# Patient Record
Sex: MALE | Race: WHITE | NOT HISPANIC OR LATINO
[De-identification: names, ages, dates, MRNs, and addresses within clinical notes are randomized per-mention and may not be internally consistent; named-entity substitution may affect disease eponyms.]

---

## 2017-03-13 PROBLEM — Z00.00 ENCOUNTER FOR PREVENTIVE HEALTH EXAMINATION: Status: ACTIVE | Noted: 2017-03-13

## 2017-05-01 ENCOUNTER — RECORD ABSTRACTING (OUTPATIENT)
Age: 77
End: 2017-05-01

## 2017-05-01 ENCOUNTER — APPOINTMENT (OUTPATIENT)
Dept: INTERNAL MEDICINE | Facility: CLINIC | Age: 77
End: 2017-05-01

## 2017-05-01 VITALS
BODY MASS INDEX: 19.04 KG/M2 | HEART RATE: 70 BPM | SYSTOLIC BLOOD PRESSURE: 110 MMHG | OXYGEN SATURATION: 95 % | DIASTOLIC BLOOD PRESSURE: 58 MMHG | WEIGHT: 133 LBS | TEMPERATURE: 97.9 F | RESPIRATION RATE: 20 BRPM | HEIGHT: 70 IN

## 2017-05-01 DIAGNOSIS — I50.9 HEART FAILURE, UNSPECIFIED: ICD-10-CM

## 2017-05-01 DIAGNOSIS — E78.00 PURE HYPERCHOLESTEROLEMIA, UNSPECIFIED: ICD-10-CM

## 2017-05-01 DIAGNOSIS — C61 MALIGNANT NEOPLASM OF PROSTATE: ICD-10-CM

## 2017-05-01 RX ORDER — ROSUVASTATIN CALCIUM 10 MG/1
10 TABLET, FILM COATED ORAL
Qty: 30 | Refills: 0 | Status: ACTIVE | COMMUNITY
Start: 2017-05-01

## 2017-06-05 ENCOUNTER — NON-APPOINTMENT (OUTPATIENT)
Age: 77
End: 2017-06-05

## 2017-06-05 ENCOUNTER — APPOINTMENT (OUTPATIENT)
Dept: INTERNAL MEDICINE | Facility: CLINIC | Age: 77
End: 2017-06-05

## 2017-06-05 VITALS
OXYGEN SATURATION: 94 % | BODY MASS INDEX: 32.93 KG/M2 | SYSTOLIC BLOOD PRESSURE: 120 MMHG | RESPIRATION RATE: 20 BRPM | TEMPERATURE: 97.9 F | DIASTOLIC BLOOD PRESSURE: 74 MMHG | HEIGHT: 70 IN | WEIGHT: 229.99 LBS | HEART RATE: 79 BPM

## 2017-06-05 DIAGNOSIS — Z82.49 FAMILY HISTORY OF ISCHEMIC HEART DISEASE AND OTHER DISEASES OF THE CIRCULATORY SYSTEM: ICD-10-CM

## 2017-06-05 DIAGNOSIS — Z80.9 FAMILY HISTORY OF MALIGNANT NEOPLASM, UNSPECIFIED: ICD-10-CM

## 2017-06-05 RX ORDER — BUDESONIDE 0.5 MG/2ML
0.5 INHALANT ORAL TWICE DAILY
Qty: 2 | Refills: 0 | Status: ACTIVE | COMMUNITY
Start: 2017-06-05

## 2017-06-05 RX ORDER — PREDNISONE 20 MG/1
20 TABLET ORAL DAILY
Qty: 19 | Refills: 0 | Status: COMPLETED | COMMUNITY
Start: 2017-05-01 | End: 2017-06-05

## 2017-06-05 RX ORDER — ARFORMOTEROL TARTRATE 15 UG/2ML
15 SOLUTION RESPIRATORY (INHALATION) TWICE DAILY
Qty: 2 | Refills: 0 | Status: ACTIVE | COMMUNITY
Start: 2017-06-05

## 2017-06-12 ENCOUNTER — MEDICATION RENEWAL (OUTPATIENT)
Age: 77
End: 2017-06-12

## 2017-07-07 ENCOUNTER — RX RENEWAL (OUTPATIENT)
Age: 77
End: 2017-07-07

## 2017-08-21 ENCOUNTER — MEDICATION RENEWAL (OUTPATIENT)
Age: 77
End: 2017-08-21

## 2017-10-03 ENCOUNTER — APPOINTMENT (OUTPATIENT)
Dept: INTERNAL MEDICINE | Facility: CLINIC | Age: 77
End: 2017-10-03
Payer: MEDICARE

## 2017-10-03 VITALS
OXYGEN SATURATION: 95 % | WEIGHT: 220 LBS | BODY MASS INDEX: 31.5 KG/M2 | HEART RATE: 72 BPM | SYSTOLIC BLOOD PRESSURE: 130 MMHG | TEMPERATURE: 97.3 F | HEIGHT: 70 IN | DIASTOLIC BLOOD PRESSURE: 75 MMHG | RESPIRATION RATE: 18 BRPM

## 2017-10-03 DIAGNOSIS — Z23 ENCOUNTER FOR IMMUNIZATION: ICD-10-CM

## 2017-10-03 PROCEDURE — 90662 IIV NO PRSV INCREASED AG IM: CPT

## 2017-10-03 PROCEDURE — G0008: CPT

## 2017-10-03 PROCEDURE — 99214 OFFICE O/P EST MOD 30 MIN: CPT | Mod: 25

## 2017-10-03 PROCEDURE — 94060 EVALUATION OF WHEEZING: CPT

## 2017-12-16 ENCOUNTER — TRANSCRIPTION ENCOUNTER (OUTPATIENT)
Age: 77
End: 2017-12-16

## 2018-01-29 ENCOUNTER — TRANSCRIPTION ENCOUNTER (OUTPATIENT)
Age: 78
End: 2018-01-29

## 2018-02-15 ENCOUNTER — APPOINTMENT (OUTPATIENT)
Dept: INTERNAL MEDICINE | Facility: CLINIC | Age: 78
End: 2018-02-15
Payer: MEDICARE

## 2018-02-15 ENCOUNTER — NON-APPOINTMENT (OUTPATIENT)
Age: 78
End: 2018-02-15

## 2018-02-15 VITALS
SYSTOLIC BLOOD PRESSURE: 130 MMHG | OXYGEN SATURATION: 96 % | TEMPERATURE: 97.7 F | WEIGHT: 220 LBS | HEIGHT: 70 IN | RESPIRATION RATE: 18 BRPM | DIASTOLIC BLOOD PRESSURE: 78 MMHG | BODY MASS INDEX: 31.5 KG/M2 | HEART RATE: 68 BPM

## 2018-02-15 PROCEDURE — 99214 OFFICE O/P EST MOD 30 MIN: CPT | Mod: 25

## 2018-02-15 PROCEDURE — 94060 EVALUATION OF WHEEZING: CPT

## 2018-02-15 RX ORDER — FLUTICASONE PROPIONATE AND SALMETEROL 50; 500 UG/1; UG/1
500-50 POWDER RESPIRATORY (INHALATION)
Refills: 0 | Status: DISCONTINUED | COMMUNITY
End: 2018-02-15

## 2018-04-09 ENCOUNTER — APPOINTMENT (OUTPATIENT)
Dept: INTERNAL MEDICINE | Facility: CLINIC | Age: 78
End: 2018-04-09
Payer: MEDICARE

## 2018-04-09 VITALS
RESPIRATION RATE: 18 BRPM | DIASTOLIC BLOOD PRESSURE: 68 MMHG | OXYGEN SATURATION: 97 % | BODY MASS INDEX: 31.78 KG/M2 | SYSTOLIC BLOOD PRESSURE: 132 MMHG | WEIGHT: 222 LBS | HEART RATE: 86 BPM | HEIGHT: 70 IN | TEMPERATURE: 97.9 F

## 2018-04-09 DIAGNOSIS — F32.9 MAJOR DEPRESSIVE DISORDER, SINGLE EPISODE, UNSPECIFIED: ICD-10-CM

## 2018-04-09 DIAGNOSIS — I10 ESSENTIAL (PRIMARY) HYPERTENSION: ICD-10-CM

## 2018-04-09 PROCEDURE — 94729 DIFFUSING CAPACITY: CPT

## 2018-04-09 PROCEDURE — 99215 OFFICE O/P EST HI 40 MIN: CPT | Mod: 25

## 2018-04-09 PROCEDURE — 94727 GAS DIL/WSHOT DETER LNG VOL: CPT

## 2018-04-09 PROCEDURE — ZZZZZ: CPT

## 2018-04-09 PROCEDURE — 94060 EVALUATION OF WHEEZING: CPT

## 2018-04-09 RX ORDER — ASPIRIN 81 MG/1
81 TABLET ORAL
Refills: 0 | Status: ACTIVE | COMMUNITY

## 2018-04-09 RX ORDER — DILTIAZEM HYDROCHLORIDE 180 MG/1
180 TABLET, EXTENDED RELEASE ORAL
Refills: 0 | Status: DISCONTINUED | COMMUNITY
End: 2018-04-09

## 2018-04-09 RX ORDER — LOSARTAN POTASSIUM 25 MG/1
25 TABLET, FILM COATED ORAL
Refills: 0 | Status: ACTIVE | COMMUNITY

## 2018-04-09 RX ORDER — NORTRIPTYLINE HYDROCHLORIDE 50 MG/1
50 CAPSULE ORAL
Refills: 0 | Status: ACTIVE | COMMUNITY

## 2018-04-09 RX ORDER — FUROSEMIDE 20 MG/1
20 TABLET ORAL
Refills: 0 | Status: ACTIVE | COMMUNITY

## 2018-04-09 RX ORDER — CARVEDILOL 12.5 MG/1
12.5 TABLET, FILM COATED ORAL
Refills: 0 | Status: DISCONTINUED | COMMUNITY
End: 2018-04-09

## 2018-04-09 RX ORDER — ALBUTEROL SULFATE 2.5 MG/3ML
(2.5 MG/3ML) SOLUTION RESPIRATORY (INHALATION)
Qty: 1 | Refills: 3 | Status: DISCONTINUED | COMMUNITY
End: 2018-04-09

## 2018-04-27 ENCOUNTER — APPOINTMENT (OUTPATIENT)
Dept: RADIOLOGY | Facility: CLINIC | Age: 78
End: 2018-04-27
Payer: MEDICARE

## 2018-04-27 ENCOUNTER — OUTPATIENT (OUTPATIENT)
Dept: OUTPATIENT SERVICES | Facility: HOSPITAL | Age: 78
LOS: 1 days | End: 2018-04-27
Payer: MEDICARE

## 2018-04-27 ENCOUNTER — APPOINTMENT (OUTPATIENT)
Dept: INTERNAL MEDICINE | Facility: CLINIC | Age: 78
End: 2018-04-27
Payer: MEDICARE

## 2018-04-27 ENCOUNTER — NON-APPOINTMENT (OUTPATIENT)
Age: 78
End: 2018-04-27

## 2018-04-27 VITALS
HEIGHT: 70 IN | DIASTOLIC BLOOD PRESSURE: 80 MMHG | WEIGHT: 217 LBS | BODY MASS INDEX: 31.07 KG/M2 | HEART RATE: 80 BPM | RESPIRATION RATE: 16 BRPM | SYSTOLIC BLOOD PRESSURE: 124 MMHG | OXYGEN SATURATION: 97 % | TEMPERATURE: 98.8 F

## 2018-04-27 DIAGNOSIS — Z00.8 ENCOUNTER FOR OTHER GENERAL EXAMINATION: ICD-10-CM

## 2018-04-27 PROCEDURE — 71046 X-RAY EXAM CHEST 2 VIEWS: CPT

## 2018-04-27 PROCEDURE — 99214 OFFICE O/P EST MOD 30 MIN: CPT | Mod: 25

## 2018-04-27 PROCEDURE — 71046 X-RAY EXAM CHEST 2 VIEWS: CPT | Mod: 26

## 2018-04-27 PROCEDURE — 94060 EVALUATION OF WHEEZING: CPT

## 2018-04-27 RX ORDER — ALBUTEROL SULFATE 90 UG/1
108 (90 BASE) AEROSOL, METERED RESPIRATORY (INHALATION)
Refills: 0 | Status: DISCONTINUED | COMMUNITY
End: 2018-04-27

## 2018-05-11 ENCOUNTER — APPOINTMENT (OUTPATIENT)
Dept: INTERNAL MEDICINE | Facility: CLINIC | Age: 78
End: 2018-05-11

## 2018-10-08 ENCOUNTER — APPOINTMENT (OUTPATIENT)
Dept: INTERNAL MEDICINE | Facility: CLINIC | Age: 78
End: 2018-10-08
Payer: MEDICARE

## 2018-10-08 ENCOUNTER — NON-APPOINTMENT (OUTPATIENT)
Age: 78
End: 2018-10-08

## 2018-10-08 VITALS
WEIGHT: 209.99 LBS | TEMPERATURE: 97.6 F | SYSTOLIC BLOOD PRESSURE: 140 MMHG | OXYGEN SATURATION: 96 % | DIASTOLIC BLOOD PRESSURE: 68 MMHG | BODY MASS INDEX: 30.06 KG/M2 | RESPIRATION RATE: 16 BRPM | HEART RATE: 64 BPM | HEIGHT: 70 IN

## 2018-10-08 PROCEDURE — G0008: CPT

## 2018-10-08 PROCEDURE — 90662 IIV NO PRSV INCREASED AG IM: CPT

## 2018-10-08 PROCEDURE — 99214 OFFICE O/P EST MOD 30 MIN: CPT | Mod: 25

## 2018-10-08 PROCEDURE — 94060 EVALUATION OF WHEEZING: CPT

## 2018-10-08 RX ORDER — PREDNISONE 20 MG/1
20 TABLET ORAL
Qty: 19 | Refills: 0 | Status: DISCONTINUED | COMMUNITY
Start: 2018-04-27 | End: 2018-10-08

## 2018-10-08 RX ORDER — CEFUROXIME AXETIL 500 MG/1
500 TABLET ORAL
Qty: 14 | Refills: 0 | Status: DISCONTINUED | COMMUNITY
Start: 2018-04-27 | End: 2018-10-08

## 2018-10-08 NOTE — HISTORY OF PRESENT ILLNESS
[de-identified] : The patient comes in today for a followup evaluation, and reassessment of his pulmonary status.\par \par Overall, from a pulmonary standpoint, the patient states that he is doing relatively well. He continues to be compliant with his aerosol regimen. He notes, that upon awakening in the morning, that he develops a slight degree of chest tightness after using his nebulizer. He then usually produces light yellow sputum, and within an hour, the symptoms of chest tightness improved and resolved. He is then usually good throughout the remainder of the day. He denies any pleuritic chest pain. There has been no hemoptysis. He continues to followup with his primary care physician.\par \par With regard to his underlying coronary artery disease, he continues to be followed by his cardiologist. He remained stable. He has not had any episodes of angina. He still does complain of shortness of breath when walking up inclines or up stairs. There were no other acute pulmonary constitutional symptoms at this time. He has not yet received his flu shot. He now comes in for this assessment.

## 2018-10-08 NOTE — DATA REVIEWED
[FreeTextEntry1] : Spirometric analysis reveals a significant degree of obstruction. Bronchodilator reactivity is not demonstrated.\par \par CAT scan of the chest performed on 9/21/18 is reviewed with the patient. The previously noted 9 mm right upper lung field nodule is nearly completely resolved. There is a new 6 x 6 mm nodule, however, inferior to the above-noted nodule. There are still other tiny clusters of tree in bud nodularity scattered in the lower lung sounds as well. Overall, these areas appear to wax and wane.

## 2018-10-08 NOTE — PHYSICAL EXAM
[General Appearance - Alert] : alert [General Appearance - In No Acute Distress] : in no acute distress [General Appearance - Well Nourished] : well nourished [Neck Appearance] : the appearance of the neck was normal [] : the neck was supple [Heart Sounds Pericardial Friction Rub] : no pericardial rub [Arterial Pulses Carotid] : carotid pulses were normal with no bruits [Bowel Sounds] : normal bowel sounds [Abdomen Soft] : soft [Abdomen Tenderness] : non-tender [FreeTextEntry1] : Moderate to significant centripetal obesity [Cervical Lymph Nodes Enlarged Posterior Bilaterally] : posterior cervical [Nail Clubbing] : no clubbing  or cyanosis of the fingernails

## 2018-10-08 NOTE — PLAN
[FreeTextEntry1] : 1. Continue his medication as outlined above including the bronchodilators.\par \par 2. With regards to the waxing and waning pulmonary nodules, they most likely represent areas of mucoid impaction. He is read as having areas of peribronchial inflammatory changes. There are also tree in bud abnormalities noted, which most likely represent inflammatory change. To this end, since there does not appear to be a suspicious single dominant lesion at this time, and given the fact that the previously noted 9 mm lesion has resolved, I will now recommend a followup study in one year, instead of 6 months.\par \par 3. Flu shot\par \par 4. Followup with myself in 6 months with full pulmonary function testing.\par \par 5. Routine medical followup with his primary care physician, Dr. Myers.

## 2018-10-29 ENCOUNTER — RX RENEWAL (OUTPATIENT)
Age: 78
End: 2018-10-29

## 2019-03-25 ENCOUNTER — FORM ENCOUNTER (OUTPATIENT)
Age: 79
End: 2019-03-25

## 2019-03-26 ENCOUNTER — APPOINTMENT (OUTPATIENT)
Age: 79
End: 2019-03-26
Payer: MEDICARE

## 2019-03-26 ENCOUNTER — OUTPATIENT (OUTPATIENT)
Dept: OUTPATIENT SERVICES | Facility: HOSPITAL | Age: 79
LOS: 1 days | End: 2019-03-26
Payer: MEDICARE

## 2019-03-26 ENCOUNTER — APPOINTMENT (OUTPATIENT)
Dept: OTOLARYNGOLOGY | Facility: CLINIC | Age: 79
End: 2019-03-26
Payer: MEDICARE

## 2019-03-26 VITALS — BODY MASS INDEX: 30.06 KG/M2 | HEIGHT: 70 IN | WEIGHT: 210 LBS

## 2019-03-26 VITALS — HEART RATE: 71 BPM | DIASTOLIC BLOOD PRESSURE: 72 MMHG | SYSTOLIC BLOOD PRESSURE: 155 MMHG

## 2019-03-26 DIAGNOSIS — I51.9 HEART DISEASE, UNSPECIFIED: ICD-10-CM

## 2019-03-26 DIAGNOSIS — C44.300 UNSPECIFIED MALIGNANT NEOPLASM OF SKIN OF UNSPECIFIED PART OF FACE: ICD-10-CM

## 2019-03-26 PROCEDURE — 99205 OFFICE O/P NEW HI 60 MIN: CPT

## 2019-03-26 PROCEDURE — 82565 ASSAY OF CREATININE: CPT

## 2019-03-26 PROCEDURE — 70491 CT SOFT TISSUE NECK W/DYE: CPT

## 2019-03-26 PROCEDURE — 70491 CT SOFT TISSUE NECK W/DYE: CPT | Mod: 26

## 2019-03-26 RX ORDER — CARVEDILOL 25 MG/1
TABLET, FILM COATED ORAL
Refills: 0 | Status: ACTIVE | COMMUNITY

## 2019-03-26 NOTE — CONSULT LETTER
[Consult Letter:] : I had the pleasure of evaluating your patient, [unfilled]. [Please see my note below.] : Please see my note below. [Consult Closing:] : Thank you very much for allowing me to participate in the care of this patient.  If you have any questions, please do not hesitate to contact me. [Sincerely,] : Sincerely, [Dear  ___] : Dear  [unfilled], [DrSadia  ___] : Dr. KRISHNA [FreeTextEntry2] : Phong Rollins MD\par [address] [place] [address]\par [place] [id number] [FreeTextEntry3] : Pedrito Curry MD, FACS\par Clinical \par Dept. of Otolaryngology\par [facility]\par  [DrSadia ___] : Dr. KRISHNA

## 2019-03-26 NOTE — HISTORY OF PRESENT ILLNESS
[de-identified] : 78 year old male referred by Dr. 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  States has had the skin cancer for at least the past 4 to 6 months.  States has been growing in the past few days.  States pain level 2/10.  States drainage, clear fluid, for the past 1.5 - 2 months.  Denies recent testing.

## 2019-03-26 NOTE — REASON FOR VISIT
[Initial Consultation] : an initial consultation for [Spouse] : spouse [Family Member] : family member [FreeTextEntry2] : referred by Dr. 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

## 2019-03-26 NOTE — PHYSICAL EXAM
[Midline] : trachea located in midline position [Normal] : no rashes [de-identified] : R pinna deformity from prior surgery [de-identified] : Nasal bridge deformity from prior surgery [de-identified] : R face lesion palpabe deep to R buccal mucosa [FreeTextEntry2] : 4.5 cm ulcerated mass of right check, with deep ulceration

## 2019-03-29 ENCOUNTER — APPOINTMENT (OUTPATIENT)
Dept: INTERNAL MEDICINE | Facility: CLINIC | Age: 79
End: 2019-03-29
Payer: COMMERCIAL

## 2019-03-29 ENCOUNTER — APPOINTMENT (OUTPATIENT)
Dept: INTERNAL MEDICINE | Facility: CLINIC | Age: 79
End: 2019-03-29
Payer: MEDICARE

## 2019-03-29 VITALS
TEMPERATURE: 98.6 F | WEIGHT: 207 LBS | BODY MASS INDEX: 29.63 KG/M2 | RESPIRATION RATE: 16 BRPM | HEART RATE: 84 BPM | HEIGHT: 70 IN | SYSTOLIC BLOOD PRESSURE: 128 MMHG | DIASTOLIC BLOOD PRESSURE: 80 MMHG | OXYGEN SATURATION: 97 %

## 2019-03-29 VITALS
OXYGEN SATURATION: 97 % | RESPIRATION RATE: 14 BRPM | SYSTOLIC BLOOD PRESSURE: 132 MMHG | HEART RATE: 86 BPM | DIASTOLIC BLOOD PRESSURE: 78 MMHG

## 2019-03-29 DIAGNOSIS — C44.300 UNSPECIFIED MALIGNANT NEOPLASM OF SKIN OF UNSPECIFIED PART OF FACE: ICD-10-CM

## 2019-03-29 PROCEDURE — 94727 GAS DIL/WSHOT DETER LNG VOL: CPT

## 2019-03-29 PROCEDURE — 99215 OFFICE O/P EST HI 40 MIN: CPT | Mod: 25

## 2019-03-29 PROCEDURE — 94729 DIFFUSING CAPACITY: CPT

## 2019-03-29 PROCEDURE — 94060 EVALUATION OF WHEEZING: CPT

## 2019-03-29 PROCEDURE — ZZZZZ: CPT

## 2019-03-29 NOTE — DATA REVIEWED
[FreeTextEntry1] : Pulmonary function test is performed. Lung volumes reveal a mild decrease in the total lung capacity and FRC. Residual volume is normal. The vital capacity is moderately reduced. Lung mechanics reveal a moderate to significant decrease in flow rates without bronchodilator reactivity. The DLCO is moderately reduced at 54%. Saturation is 97%. This represents a significant degree of obstructive disease. The constellation of the reduction in flow rates, the absence of bronchodilator reactivity, and a decrease in the DLCO, is consistent with anatomic emphysema. An underlying restrictive process cannot be entirely ruled out. Saturation is maintained.

## 2019-03-29 NOTE — PLAN
[FreeTextEntry1] : 1. Continue with medication as outlined above.\par \par 2. With regards to the upcoming surgical resection of the squamous cell carcinoma on the right side of his face, he will be pretreated with prednisone which she will take 40 mg 2 days prior to the procedure, 40 mg one day prior to the procedure, and 20 mg on the day of the procedure to treat his underlying advanced obstructive lung disease.\par \par 3. The patient will continue to follow up with myself as previously recommended with a CAT scan of the chest which needs to be performed in September of this year. A note is the fact that the patient had waxing and waning pulmonary nodules which are felt to represent mucoid impaction. Treated in odd abnormalities were also noted which are felt to represent inflammatory change. There were no solitary suspicious dominant lesions, and the previously noted 9 mm lesion has resolved completely.\par \par 4. Followup in approximately one month with pre-post spirometry and O2 saturation. The patient will be given a prescription at that time for a followup CAT scan to be performed in September\par \par \par \par Addendum: There are no absolute pulmonary contraindications to the preplanned procedure.

## 2019-03-29 NOTE — PHYSICAL EXAM
[General Appearance - Alert] : alert [General Appearance - In No Acute Distress] : in no acute distress [General Appearance - Well Nourished] : well nourished [Sclera] : the sclera and conjunctiva were normal [PERRL With Normal Accommodation] : pupils were equal in size, round, and reactive to light [Extraocular Movements] : extraocular movements were intact [Neck Appearance] : the appearance of the neck was normal [] : the neck was supple [Heart Sounds Pericardial Friction Rub] : no pericardial rub [Arterial Pulses Carotid] : carotid pulses were normal with no bruits [Bowel Sounds] : normal bowel sounds [Abdomen Soft] : soft [Abdomen Tenderness] : non-tender [Cervical Lymph Nodes Enlarged Posterior Bilaterally] : posterior cervical [Nail Clubbing] : no clubbing  or cyanosis of the fingernails [Skin Color & Pigmentation] : normal skin color and pigmentation [FreeTextEntry1] : Evidence for multiple areas in which skin cancer was removed.There is a large cavitary lesion in the middle of the right cheek measuring 2 x 1.5 cm. The margins are clean without purulent drainage noted

## 2019-03-29 NOTE — REVIEW OF SYSTEMS
[Negative] : Heme/Lymph [FreeTextEntry4] : see history of present illness [FreeTextEntry6] : see history of present illness [de-identified] : see history of present illness

## 2019-03-29 NOTE — HISTORY OF PRESENT ILLNESS
[de-identified] : The patient comes in today for a preoperative pulmonary clearance.\par \par The patient states that he noted the onset of the lesion on the right side of his face in January. He began picking at the lesion, and it began to get larger. He went to [place]. The lesion then began oozing clear fluid. He returned home, and was seen by a dermatologist on 3/14/19. A biopsy was performed and it revealed advanced squamous cell carcinoma. He then underwent a CAT scan of the neck. He is noted to have a 1.9 x 1.7 x 3.6 cm lesion in the right cheek region. A second lesion measuring 2.4 x 2.6 cm was noted to be present near the sternocleidomastoid and clavicle region. He now comes in for this assessment.\par \par The patient states that from a palmar standpoint he has been doing well. He is very compliant with his nebulizer using budesonide and formoterol on a b.i.d. basis. He also uses the spur Daniella on a daily basis as well. He did have one episode of bronchitis back in December that was treated with doxycycline and a Medrol Dosepak. This was while he was in [place]. He was noted to have good results. He is now basically back to his baseline.\par \par At this time, the patient denies any purulent sputum production. He does occasionally produce clear secretions. He denies any overt wheezing. He has shortness of breath with moderate amounts of exertion, but has not been any progression. He now comes in for this assessment.

## 2019-03-30 ENCOUNTER — TRANSCRIPTION ENCOUNTER (OUTPATIENT)
Age: 79
End: 2019-03-30

## 2019-04-03 ENCOUNTER — RESULT REVIEW (OUTPATIENT)
Age: 79
End: 2019-04-03

## 2019-04-03 DIAGNOSIS — R22.1 LOCALIZED SWELLING, MASS AND LUMP, NECK: ICD-10-CM

## 2019-04-07 ENCOUNTER — FORM ENCOUNTER (OUTPATIENT)
Age: 79
End: 2019-04-07

## 2019-04-08 ENCOUNTER — APPOINTMENT (OUTPATIENT)
Dept: ULTRASOUND IMAGING | Facility: CLINIC | Age: 79
End: 2019-04-08
Payer: MEDICARE

## 2019-04-08 ENCOUNTER — OUTPATIENT (OUTPATIENT)
Dept: OUTPATIENT SERVICES | Facility: HOSPITAL | Age: 79
LOS: 1 days | End: 2019-04-08
Payer: MEDICARE

## 2019-04-08 ENCOUNTER — RESULT REVIEW (OUTPATIENT)
Age: 79
End: 2019-04-08

## 2019-04-08 DIAGNOSIS — R22.1 LOCALIZED SWELLING, MASS AND LUMP, NECK: ICD-10-CM

## 2019-04-08 DIAGNOSIS — C44.300 UNSPECIFIED MALIGNANT NEOPLASM OF SKIN OF UNSPECIFIED PART OF FACE: ICD-10-CM

## 2019-04-08 PROCEDURE — 10005 FNA BX W/US GDN 1ST LES: CPT

## 2019-04-15 ENCOUNTER — MESSAGE (OUTPATIENT)
Age: 79
End: 2019-04-15

## 2019-04-15 ENCOUNTER — RESULT REVIEW (OUTPATIENT)
Age: 79
End: 2019-04-15

## 2019-04-22 ENCOUNTER — APPOINTMENT (OUTPATIENT)
Dept: INTERNAL MEDICINE | Facility: CLINIC | Age: 79
End: 2019-04-22

## 2019-05-09 ENCOUNTER — FORM ENCOUNTER (OUTPATIENT)
Age: 79
End: 2019-05-09

## 2019-05-10 ENCOUNTER — OUTPATIENT (OUTPATIENT)
Dept: OUTPATIENT SERVICES | Facility: HOSPITAL | Age: 79
LOS: 1 days | End: 2019-05-10
Payer: MEDICARE

## 2019-05-10 ENCOUNTER — APPOINTMENT (OUTPATIENT)
Dept: CT IMAGING | Facility: CLINIC | Age: 79
End: 2019-05-10
Payer: MEDICARE

## 2019-05-10 DIAGNOSIS — R91.8 OTHER NONSPECIFIC ABNORMAL FINDING OF LUNG FIELD: ICD-10-CM

## 2019-05-10 PROCEDURE — 71250 CT THORAX DX C-: CPT | Mod: 26

## 2019-05-10 PROCEDURE — 71250 CT THORAX DX C-: CPT

## 2019-05-20 ENCOUNTER — APPOINTMENT (OUTPATIENT)
Dept: INTERNAL MEDICINE | Facility: CLINIC | Age: 79
End: 2019-05-20
Payer: MEDICARE

## 2019-05-20 ENCOUNTER — NON-APPOINTMENT (OUTPATIENT)
Age: 79
End: 2019-05-20

## 2019-05-20 VITALS
HEART RATE: 76 BPM | HEIGHT: 70 IN | SYSTOLIC BLOOD PRESSURE: 124 MMHG | DIASTOLIC BLOOD PRESSURE: 66 MMHG | RESPIRATION RATE: 16 BRPM | TEMPERATURE: 98.1 F | WEIGHT: 208 LBS | BODY MASS INDEX: 29.78 KG/M2 | OXYGEN SATURATION: 97 %

## 2019-05-20 DIAGNOSIS — R06.2 WHEEZING: ICD-10-CM

## 2019-05-20 DIAGNOSIS — Z88.9 ALLERGY STATUS TO UNSPECIFIED DRUGS, MEDICAMENTS AND BIOLOGICAL SUBSTANCES: ICD-10-CM

## 2019-05-20 DIAGNOSIS — J44.9 CHRONIC OBSTRUCTIVE PULMONARY DISEASE, UNSPECIFIED: ICD-10-CM

## 2019-05-20 DIAGNOSIS — J30.2 OTHER SEASONAL ALLERGIC RHINITIS: ICD-10-CM

## 2019-05-20 PROCEDURE — 94060 EVALUATION OF WHEEZING: CPT

## 2019-05-20 PROCEDURE — 99214 OFFICE O/P EST MOD 30 MIN: CPT | Mod: 25

## 2019-05-20 RX ORDER — PREDNISONE 20 MG/1
20 TABLET ORAL
Qty: 5 | Refills: 0 | Status: DISCONTINUED | COMMUNITY
Start: 2019-03-29 | End: 2019-05-20

## 2019-06-07 ENCOUNTER — RX RENEWAL (OUTPATIENT)
Age: 79
End: 2019-06-07

## 2019-06-09 PROBLEM — I51.9 HEART DISEASE: Status: ACTIVE | Noted: 2019-03-26

## 2019-07-11 ENCOUNTER — FORM ENCOUNTER (OUTPATIENT)
Age: 79
End: 2019-07-11

## 2019-07-12 ENCOUNTER — APPOINTMENT (OUTPATIENT)
Dept: NUCLEAR MEDICINE | Facility: CLINIC | Age: 79
End: 2019-07-12
Payer: MEDICARE

## 2019-07-12 ENCOUNTER — OUTPATIENT (OUTPATIENT)
Dept: OUTPATIENT SERVICES | Facility: HOSPITAL | Age: 79
LOS: 1 days | End: 2019-07-12
Payer: MEDICARE

## 2019-07-12 DIAGNOSIS — R91.8 OTHER NONSPECIFIC ABNORMAL FINDING OF LUNG FIELD: ICD-10-CM

## 2019-07-12 PROCEDURE — 78815 PET IMAGE W/CT SKULL-THIGH: CPT | Mod: 26,PS

## 2019-07-12 PROCEDURE — A9552: CPT

## 2019-07-12 PROCEDURE — 78815 PET IMAGE W/CT SKULL-THIGH: CPT

## 2019-07-18 RX ORDER — TIOTROPIUM BROMIDE INHALATION SPRAY 1.56 UG/1
1.25 SPRAY, METERED RESPIRATORY (INHALATION) DAILY
Qty: 1 | Refills: 11 | Status: DISCONTINUED | COMMUNITY
End: 2019-07-18

## 2019-07-22 ENCOUNTER — NON-APPOINTMENT (OUTPATIENT)
Age: 79
End: 2019-07-22

## 2019-07-22 ENCOUNTER — APPOINTMENT (OUTPATIENT)
Dept: INTERNAL MEDICINE | Facility: CLINIC | Age: 79
End: 2019-07-22
Payer: MEDICARE

## 2019-07-22 VITALS
OXYGEN SATURATION: 95 % | WEIGHT: 204 LBS | HEIGHT: 70 IN | HEART RATE: 80 BPM | TEMPERATURE: 98.1 F | DIASTOLIC BLOOD PRESSURE: 62 MMHG | RESPIRATION RATE: 16 BRPM | BODY MASS INDEX: 29.2 KG/M2 | SYSTOLIC BLOOD PRESSURE: 132 MMHG

## 2019-07-22 DIAGNOSIS — Z87.891 PERSONAL HISTORY OF NICOTINE DEPENDENCE: ICD-10-CM

## 2019-07-22 DIAGNOSIS — R91.8 OTHER NONSPECIFIC ABNORMAL FINDING OF LUNG FIELD: ICD-10-CM

## 2019-07-22 DIAGNOSIS — J44.9 CHRONIC OBSTRUCTIVE PULMONARY DISEASE, UNSPECIFIED: ICD-10-CM

## 2019-07-22 DIAGNOSIS — R05 COUGH: ICD-10-CM

## 2019-07-22 DIAGNOSIS — R06.00 DYSPNEA, UNSPECIFIED: ICD-10-CM

## 2019-07-22 DIAGNOSIS — J43.9 EMPHYSEMA, UNSPECIFIED: ICD-10-CM

## 2019-07-22 DIAGNOSIS — I25.10 ATHEROSCLEROTIC HEART DISEASE OF NATIVE CORONARY ARTERY W/OUT ANGINA PECTORIS: ICD-10-CM

## 2019-07-22 PROCEDURE — 94060 EVALUATION OF WHEEZING: CPT

## 2019-07-22 PROCEDURE — 99215 OFFICE O/P EST HI 40 MIN: CPT | Mod: 25

## 2019-07-28 ENCOUNTER — FORM ENCOUNTER (OUTPATIENT)
Age: 79
End: 2019-07-28

## 2019-07-29 ENCOUNTER — RESULT REVIEW (OUTPATIENT)
Age: 79
End: 2019-07-29

## 2019-07-29 ENCOUNTER — OUTPATIENT (OUTPATIENT)
Dept: OUTPATIENT SERVICES | Facility: HOSPITAL | Age: 79
LOS: 1 days | Discharge: HOME | End: 2019-07-29
Payer: MEDICARE

## 2019-07-29 VITALS
TEMPERATURE: 98 F | WEIGHT: 206.35 LBS | DIASTOLIC BLOOD PRESSURE: 71 MMHG | SYSTOLIC BLOOD PRESSURE: 135 MMHG | RESPIRATION RATE: 16 BRPM | HEIGHT: 70 IN | OXYGEN SATURATION: 99 % | HEART RATE: 66 BPM

## 2019-07-29 VITALS
OXYGEN SATURATION: 99 % | TEMPERATURE: 98 F | RESPIRATION RATE: 16 BRPM | SYSTOLIC BLOOD PRESSURE: 126 MMHG | DIASTOLIC BLOOD PRESSURE: 55 MMHG | HEART RATE: 81 BPM

## 2019-07-29 DIAGNOSIS — Z95.5 PRESENCE OF CORONARY ANGIOPLASTY IMPLANT AND GRAFT: Chronic | ICD-10-CM

## 2019-07-29 DIAGNOSIS — R91.8 OTHER NONSPECIFIC ABNORMAL FINDING OF LUNG FIELD: ICD-10-CM

## 2019-07-29 LAB
ANION GAP SERPL CALC-SCNC: 5 MMOL/L — SIGNIFICANT CHANGE UP (ref 5–17)
BUN SERPL-MCNC: 13 MG/DL — SIGNIFICANT CHANGE UP (ref 7–23)
CALCIUM SERPL-MCNC: 9.1 MG/DL — SIGNIFICANT CHANGE UP (ref 8.5–10.1)
CHLORIDE SERPL-SCNC: 105 MMOL/L — SIGNIFICANT CHANGE UP (ref 96–108)
CO2 SERPL-SCNC: 29 MMOL/L — SIGNIFICANT CHANGE UP (ref 22–31)
CREAT SERPL-MCNC: 0.87 MG/DL — SIGNIFICANT CHANGE UP (ref 0.5–1.3)
GLUCOSE SERPL-MCNC: 111 MG/DL — HIGH (ref 70–99)
HCT VFR BLD CALC: 39.6 % — SIGNIFICANT CHANGE UP (ref 39–50)
HGB BLD-MCNC: 12.8 G/DL — LOW (ref 13–17)
INR BLD: 1.03 RATIO — SIGNIFICANT CHANGE UP (ref 0.88–1.16)
INR PPP: 1.03
MCHC RBC-ENTMCNC: 28.8 PG — SIGNIFICANT CHANGE UP (ref 27–34)
MCHC RBC-ENTMCNC: 32.3 GM/DL — SIGNIFICANT CHANGE UP (ref 32–36)
MCV RBC AUTO: 89.2 FL — SIGNIFICANT CHANGE UP (ref 80–100)
PLATELET # BLD AUTO: 201 K/UL — SIGNIFICANT CHANGE UP (ref 150–400)
POTASSIUM SERPL-MCNC: 4.1 MMOL/L — SIGNIFICANT CHANGE UP (ref 3.5–5.3)
POTASSIUM SERPL-SCNC: 4.1 MMOL/L — SIGNIFICANT CHANGE UP (ref 3.5–5.3)
PROTHROM AB SERPL-ACNC: 11.4 SEC — SIGNIFICANT CHANGE UP (ref 10–12.9)
PT BLD: 11.4
RBC # BLD: 4.44 M/UL — SIGNIFICANT CHANGE UP (ref 4.2–5.8)
RBC # FLD: 13.4 % — SIGNIFICANT CHANGE UP (ref 10.3–14.5)
SODIUM SERPL-SCNC: 139 MMOL/L — SIGNIFICANT CHANGE UP (ref 135–145)
WBC # BLD: 3.08 K/UL — LOW (ref 3.8–10.5)
WBC # FLD AUTO: 3.08 K/UL — LOW (ref 3.8–10.5)

## 2019-07-29 PROCEDURE — 32405: CPT

## 2019-07-29 PROCEDURE — 71045 X-RAY EXAM CHEST 1 VIEW: CPT | Mod: 26

## 2019-07-29 PROCEDURE — 88341 IMHCHEM/IMCYTCHM EA ADD ANTB: CPT | Mod: 26

## 2019-07-29 PROCEDURE — 71045 X-RAY EXAM CHEST 1 VIEW: CPT | Mod: 26,77

## 2019-07-29 PROCEDURE — 88173 CYTOPATH EVAL FNA REPORT: CPT | Mod: 26

## 2019-07-29 PROCEDURE — 77012 CT SCAN FOR NEEDLE BIOPSY: CPT | Mod: 26

## 2019-07-29 PROCEDURE — 88342 IMHCHEM/IMCYTCHM 1ST ANTB: CPT | Mod: 26

## 2019-07-29 PROCEDURE — 93010 ELECTROCARDIOGRAM REPORT: CPT

## 2019-07-29 PROCEDURE — 88305 TISSUE EXAM BY PATHOLOGIST: CPT | Mod: 26

## 2019-07-29 RX ORDER — CARVEDILOL PHOSPHATE 80 MG/1
1 CAPSULE, EXTENDED RELEASE ORAL
Qty: 0 | Refills: 0 | DISCHARGE

## 2019-07-29 RX ORDER — FUROSEMIDE 40 MG
1 TABLET ORAL
Qty: 0 | Refills: 0 | DISCHARGE

## 2019-07-29 RX ORDER — FENTANYL CITRATE 50 UG/ML
50 INJECTION INTRAVENOUS
Refills: 0 | Status: DISCONTINUED | OUTPATIENT
Start: 2019-07-29 | End: 2019-07-29

## 2019-07-29 RX ORDER — TIOTROPIUM BROMIDE 18 UG/1
2 CAPSULE ORAL; RESPIRATORY (INHALATION)
Qty: 0 | Refills: 0 | DISCHARGE

## 2019-07-29 RX ORDER — ONDANSETRON 8 MG/1
4 TABLET, FILM COATED ORAL ONCE
Refills: 0 | Status: DISCONTINUED | OUTPATIENT
Start: 2019-07-29 | End: 2019-07-29

## 2019-07-29 RX ORDER — OXYCODONE HYDROCHLORIDE 5 MG/1
5 TABLET ORAL ONCE
Refills: 0 | Status: DISCONTINUED | OUTPATIENT
Start: 2019-07-29 | End: 2019-07-29

## 2019-07-29 RX ORDER — NORTRIPTYLINE HYDROCHLORIDE 10 MG/1
1 CAPSULE ORAL
Qty: 0 | Refills: 0 | DISCHARGE

## 2019-07-29 RX ORDER — BUDESONIDE, MICRONIZED 100 %
2 POWDER (GRAM) MISCELLANEOUS
Qty: 0 | Refills: 0 | DISCHARGE

## 2019-07-29 RX ORDER — ARFORMOTEROL TARTRATE 15 UG/2ML
2 SOLUTION RESPIRATORY (INHALATION)
Qty: 0 | Refills: 0 | DISCHARGE

## 2019-07-29 RX ORDER — METFORMIN HYDROCHLORIDE 850 MG/1
1 TABLET ORAL
Qty: 0 | Refills: 0 | DISCHARGE

## 2019-07-29 RX ORDER — ROSUVASTATIN CALCIUM 5 MG/1
1 TABLET ORAL
Qty: 0 | Refills: 0 | DISCHARGE

## 2019-07-29 RX ORDER — SODIUM CHLORIDE 9 MG/ML
1000 INJECTION, SOLUTION INTRAVENOUS
Refills: 0 | Status: DISCONTINUED | OUTPATIENT
Start: 2019-07-29 | End: 2019-07-29

## 2019-07-29 RX ORDER — LOSARTAN POTASSIUM 100 MG/1
1 TABLET, FILM COATED ORAL
Qty: 0 | Refills: 0 | DISCHARGE

## 2019-07-29 RX ORDER — OXYCODONE HYDROCHLORIDE 5 MG/1
10 TABLET ORAL ONCE
Refills: 0 | Status: DISCONTINUED | OUTPATIENT
Start: 2019-07-29 | End: 2019-07-29

## 2019-07-29 RX ORDER — ASPIRIN/CALCIUM CARB/MAGNESIUM 324 MG
1 TABLET ORAL
Qty: 0 | Refills: 0 | DISCHARGE

## 2019-07-29 NOTE — ASU PATIENT PROFILE, ADULT - PRO MENTAL HEALTH SX RECENT
Site: [facility], [place]  Date 4 9 18  Surgeon: Kendal  Anesthesia: iv sedation and local infiltration xylocaine  Preop diagnosis: elevated psa  Postop diagnosis: same  Procedure: TRANSRECTAL ULTRASOUND OF PROSTATE WITH NEEDLE BIOPSIES. ULTRASOUND GUIDANCE FOR NEEDLE BIOPSIES  Complications: none  EBL: None    Description of the procedure:  Patient took a prophylactic antibiotic starting last night. He also gave himself a Fleet enema 2 hours prior to the procedure. Pt was taken to the operating room. He was placed in the L lateral decubitus position. We instilled a 10-ml enema of pure Betadine solution in the rectal ampulla and waited a few minutes.   The rectal probe of the Octopus Deploy S-nerve ultrasound machine with 5-to-8 Hz frequency was inserted and multiple transverse and longitudinal scans were done.     Prostate measurements:  Gland volume: 25.3    cm3  Gland width (transverse): 4.38 cm  Gland height (anteroposterior): 2.75 cm  Gland length (cephalocaudal): 4.01 cm      The general shape of the prostate was symmetric. Several small calcific densities were seen, as well as various small cystic lesions. No definite hypoechoic areas were seen.   We performed a prostate block (pudendal block) with a total of 10 ml of xylocaine applied at the prostate base and prostate apex on both sides. Multiple biopsies were obtained using the Perfect Price Magnum Biopsy Needle in the 22 mm length setting. We used the sextant topographic technique to distribute and send the biopsy samples. The patient tolerated the procedure well. He was transferred to Recovery Room.    Patient was warned about possible complications, such as persistent hematuria, hematochezia, hematospermia, infection and sepsis. He is to continue with the antibiotic given until finished. Drink abundant fluids. Call in one week for pathology results     none

## 2019-07-29 NOTE — ASU PATIENT PROFILE, ADULT - PMH
CAD (coronary artery disease)    CHF (congestive heart failure)    COPD (chronic obstructive pulmonary disease)    HTN (hypertension)    Prostate CA    Pulmonary nodule    Skin cancer  face and neck

## 2019-07-29 NOTE — CONSULT NOTE ADULT - SUBJECTIVE AND OBJECTIVE BOX
Vascular & Interventional Radiology Pre-Procedure Note    Procedure Name: R Lung Bx    HPI: 79y Male with R Lung mass, concerning for malignancy. IR consulted for CT guided lung biopsy.     Long discussion with patient about his outpatient medications. He states he was instructed to stop aspirin and plavix x 5 days by Dr. Blanchard. The patient personally looked at all of his prescription and nonprescription medicines and made a list two days ago. Plavix is not on the list and the patient states he does not currently take Plavix and can not recall the last time he did, but he expects it was stopped years ago. Dr. Pretty discussed with Dr. Blanchard who states he placed in note because EMR said patient was on medicine.     The patient is adamant he does not take plavix and would not have taken it within the last 5 days.  Patient stopped ASA 81mg x 5 days.     Allergies:   Medications (Abx/Cardiac/Anticoagulation/Blood Products)      Data:  177.8  93.6  T(C): 36.6  HR: 66  BP: 135/71  RR: 16  SpO2: 99%    Exam  General: NAD    -WBC 3.08 / HgB 12.8 / Hct 39.6 / Plt 201  -Na 139 / Cl 105 / BUN 13 / Glucose 111  -K 4.1 / CO2 29 / Cr 0.87  -ALT -- / Alk Phos -- / T.Bili --  -INR1.03    Imaging: CT reviewed    Plan:   -79y Male presents for R lung biopsy  -Patient educated at length re: risk of bleeding and PTX, especially given central location of lesion with adjacent vessels and fissures.   -Risks/Benefits/alternatives explained with the patient and/or healthcare proxy and witnessed informed consent obtained.

## 2019-07-29 NOTE — BRIEF OPERATIVE NOTE - COMMENTS
Good postoperative appearance. 1) Baseline, 2hr, 4hr CXR  2) Monitor for signs and symptoms of continued hemorrhage

## 2019-07-29 NOTE — BRIEF OPERATIVE NOTE - OPERATION/FINDINGS
1) Access of R lower lobe lung nodule under CT guidance with 17G  2) 18G core x 2, adequate  3) Parenchymal hemorrhage and small hemothorax on final CT. No PTX  4) Patient sent to PACU with VS WNL, awake, alert, NAD

## 2019-08-09 ENCOUNTER — OUTPATIENT (OUTPATIENT)
Dept: OUTPATIENT SERVICES | Facility: HOSPITAL | Age: 79
LOS: 1 days | End: 2019-08-09
Payer: MEDICARE

## 2019-08-09 ENCOUNTER — RESULT REVIEW (OUTPATIENT)
Age: 79
End: 2019-08-09

## 2019-08-09 DIAGNOSIS — C34.91 MALIGNANT NEOPLASM OF UNSPECIFIED PART OF RIGHT BRONCHUS OR LUNG: ICD-10-CM

## 2019-08-09 DIAGNOSIS — Z95.5 PRESENCE OF CORONARY ANGIOPLASTY IMPLANT AND GRAFT: Chronic | ICD-10-CM

## 2019-08-09 PROBLEM — I50.9 HEART FAILURE, UNSPECIFIED: Chronic | Status: ACTIVE | Noted: 2019-07-29

## 2019-08-09 PROBLEM — I25.10 ATHEROSCLEROTIC HEART DISEASE OF NATIVE CORONARY ARTERY WITHOUT ANGINA PECTORIS: Chronic | Status: ACTIVE | Noted: 2019-07-29

## 2019-08-09 PROBLEM — J44.9 CHRONIC OBSTRUCTIVE PULMONARY DISEASE, UNSPECIFIED: Chronic | Status: ACTIVE | Noted: 2019-07-29

## 2019-08-09 PROBLEM — I10 ESSENTIAL (PRIMARY) HYPERTENSION: Chronic | Status: ACTIVE | Noted: 2019-07-29

## 2019-08-09 PROBLEM — C44.90 UNSPECIFIED MALIGNANT NEOPLASM OF SKIN, UNSPECIFIED: Chronic | Status: ACTIVE | Noted: 2019-07-29

## 2019-08-09 PROBLEM — C61 MALIGNANT NEOPLASM OF PROSTATE: Chronic | Status: ACTIVE | Noted: 2019-07-29

## 2019-08-09 PROBLEM — R91.1 SOLITARY PULMONARY NODULE: Chronic | Status: ACTIVE | Noted: 2019-07-29

## 2019-08-09 PROCEDURE — 88321 CONSLTJ&REPRT SLD PREP ELSWR: CPT

## 2019-08-10 DIAGNOSIS — C34.91 MALIGNANT NEOPLASM OF UNSPECIFIED PART OF RIGHT BRONCHUS OR LUNG: ICD-10-CM

## 2019-08-12 RX ORDER — CLOPIDOGREL 75 MG/1
75 TABLET, FILM COATED ORAL
Refills: 0 | Status: DISCONTINUED | COMMUNITY
End: 2019-08-12

## 2019-08-13 LAB — SURGICAL PATHOLOGY STUDY: SIGNIFICANT CHANGE UP

## 2019-08-14 DIAGNOSIS — Z85.828 PERSONAL HISTORY OF OTHER MALIGNANT NEOPLASM OF SKIN: ICD-10-CM

## 2019-08-14 DIAGNOSIS — Z95.5 PRESENCE OF CORONARY ANGIOPLASTY IMPLANT AND GRAFT: ICD-10-CM

## 2019-08-14 DIAGNOSIS — I11.0 HYPERTENSIVE HEART DISEASE WITH HEART FAILURE: ICD-10-CM

## 2019-08-14 DIAGNOSIS — Z79.82 LONG TERM (CURRENT) USE OF ASPIRIN: ICD-10-CM

## 2019-08-14 DIAGNOSIS — J44.9 CHRONIC OBSTRUCTIVE PULMONARY DISEASE, UNSPECIFIED: ICD-10-CM

## 2019-08-14 DIAGNOSIS — R91.8 OTHER NONSPECIFIC ABNORMAL FINDING OF LUNG FIELD: ICD-10-CM

## 2019-08-18 NOTE — PHYSICAL EXAM
[Coordination Grossly Intact] : coordination grossly intact [Normal Gait] : normal gait [Normal] : affect was normal and insight and judgment were intact [General Appearance - Alert] : alert [General Appearance - In No Acute Distress] : in no acute distress [General Appearance - Well Nourished] : well nourished [Sclera] : the sclera and conjunctiva were normal [PERRL With Normal Accommodation] : pupils were equal in size, round, and reactive to light [Extraocular Movements] : extraocular movements were intact [Neck Appearance] : the appearance of the neck was normal [] : the neck was supple [Heart Sounds Pericardial Friction Rub] : no pericardial rub [Arterial Pulses Carotid] : carotid pulses were normal with no bruits [Bowel Sounds] : normal bowel sounds [Abdomen Soft] : soft [Abdomen Tenderness] : non-tender [Cervical Lymph Nodes Enlarged Posterior Bilaterally] : posterior cervical [Nail Clubbing] : no clubbing  or cyanosis of the fingernails [Skin Color & Pigmentation] : normal skin color and pigmentation [FreeTextEntry1] : Evidence for multiple areas in which skin cancer was removed.There is a healed area in the middle of the right cheek. The margins are clean.

## 2019-08-18 NOTE — DATA REVIEWED
[FreeTextEntry1] : Spirometric analysis reveals a moderate to significant degree of obstruction without bronchodilator reactivity.\par \par CAT scan of the chest performed on 5/10/19 now reveals a 2 x 1.4 cm ill-defined nodule in the right upper lung field. This was compared to the CAT scan of the neck which did have constant the upper chest, and there is no change compared to the characteristics of the abnormality noted on that study. There are no enlarged lymph nodes.\par \par

## 2019-08-18 NOTE — REVIEW OF SYSTEMS
[Negative] : Heme/Lymph [de-identified] : see history of present illness [FreeTextEntry6] : see history of present illness

## 2019-08-18 NOTE — PLAN
[FreeTextEntry1] : 1. Continue with medication as outlined above.\par \par 2. The patient will discontinue the Plavix and aspirin after his last dose today. This will be in preparation for the transthoracic needle biopsy which will attempted to be set up for 7/29/19. We will write the results of the biopsy. Further recommendations will be made at that time after the biopsy is reviewed.\par \par Addendum: There are no absolute contraindications to the preplanned procedure.\par \par

## 2019-08-18 NOTE — PHYSICAL EXAM
[General Appearance - Alert] : alert [General Appearance - In No Acute Distress] : in no acute distress [General Appearance - Well Nourished] : well nourished [Sclera] : the sclera and conjunctiva were normal [PERRL With Normal Accommodation] : pupils were equal in size, round, and reactive to light [Extraocular Movements] : extraocular movements were intact [Neck Appearance] : the appearance of the neck was normal [] : the neck was supple [Heart Sounds Pericardial Friction Rub] : no pericardial rub [Arterial Pulses Carotid] : carotid pulses were normal with no bruits [Bowel Sounds] : normal bowel sounds [Abdomen Soft] : soft [Abdomen Tenderness] : non-tender [Cervical Lymph Nodes Enlarged Posterior Bilaterally] : posterior cervical [Nail Clubbing] : no clubbing  or cyanosis of the fingernails [Skin Color & Pigmentation] : normal skin color and pigmentation [FreeTextEntry1] : Evidence for multiple areas in which skin cancer was removed.

## 2019-08-18 NOTE — DATA REVIEWED
[FreeTextEntry1] : Spirometric analysis reveals a significant degree of obstructive lung disease. Bronchodilator reactivity is not demonstrated.\par \par EKG performed on 3/28/19 but his cardiologist reveals sinus rhythm at a rate of 75. There are nonspecific T-wave changes noted. The intervals are normal. There is a borderline first degree AV block.

## 2019-08-18 NOTE — HISTORY OF PRESENT ILLNESS
[de-identified] : The patient comes in today for a followup evaluation, and reassessment of his pulmonary status.\par \par The patient is currently recovering, from fairly extensive surgery to remove 2 squamous cell carcinomas. The first lesion was on the right side of his face anterior to the parotid gland, and the second lesion was near the sternocleidomastoid and clavicle also on the same side. He states that it was a prolonged procedure, but he did well overall.\par \par The patient had undergone a CAT scan of the neck on 3/26/19,, which was ordered by his ENT physician. The CAT scan revealed a lesion in the right upper lung field. He then underwent a followup CAT scan of the chest on 5/10/19, and in that region, there was a 2 x 1.4 cm lesion in the right upper lung field which stated it was unchanged from the prior study which was the CAT scan of the neck, performed on 3/26/19. Of note is the fact that the most recent CAT scan performed on 5/10, was not supposed to be performed until September, which was one year from the prior study. The patient denies any fevers chills or night sweats.\par \par The patient notes that his breathing has been quite good. He has been ambulating without difficulty. He does have slight sputum production, which she attributes to allergy mediated symptoms. He denies any hemoptysis. There has been no weight loss. He now comes in for this assessment.

## 2019-08-18 NOTE — PLAN
[FreeTextEntry1] : One. Continue with medication as outlined above.\par \par 2. With regards to this new finding in the right upper lung field, we will now perform a comparison with the outside films performed at [facility] from March of 2018, and September of 2018. Will need to consider possible PET CT scan given this abnormality. It may represent mucous plugging, but certainly an underlying neoplasm needs to be ruled out. However, this did not appear to be present, according to the radiologist, in September of 2018. Further recommendations will be after the comparative studies are evaluated by myself.\par \par 3. Followup in 6 months with pre-post spirometry and O2 saturation.

## 2019-08-18 NOTE — HISTORY OF PRESENT ILLNESS
[de-identified] : The patient comes in today for a preoperative medical clearance.\par \par The patient states that he is doing relatively well at this time. The patient underwent fairly extensive surgery on his face and neck, to remove an invasive squamous cell carcinoma. He was noted to have one positive supraclavicular lymph node that was present. He has been followed by his ENT physician very carefully.\par \par The patient did undergo a followup surveillance CAT scan of the chest back in September of 2018. At that time he was noted to have a 6 mm nodular density in the inferior right upper lung field. When the patient went for a CAT scan of the neck before his surgery to remove the squamous cell carcinoma, he was noted to have a 2 cm lesion in the right upper lung field. It appears as if the lesion had grown significantly from September. He then underwent a followup CAT scan in May which confirmed the presence of the lesion in the right upper lung field. A PET CT scan was then performed last week, which revealed the same lesion with an SUV of 22.6.\par \par The case was presented at the multidisciplinary thoracic oncology conference. The patient is now being prepared to undergo an outpatient transthoracic needle biopsy which will be performed in the interventional radiology suite.\par \par At this time, he states that he has been compliant with his medications. He uses his nebulizer twice a day. He continues with spur Daniella as well. He does produce sputum on a daily basis which is usually clear. He has not had any hemoptysis. There has been no significant weight loss. He denies chest pains. He now comes in for this assessment.

## 2019-11-27 PROBLEM — J44.9 CHRONIC ASTHMATIC BRONCHITIS: Status: ACTIVE | Noted: 2017-05-01

## 2019-12-10 ENCOUNTER — MEDICATION RENEWAL (OUTPATIENT)
Age: 79
End: 2019-12-10

## 2019-12-10 RX ORDER — TIOTROPIUM BROMIDE INHALATION SPRAY 3.12 UG/1
2.5 SPRAY, METERED RESPIRATORY (INHALATION)
Qty: 3 | Refills: 1 | Status: ACTIVE | COMMUNITY
Start: 2017-07-07 | End: 1900-01-01

## 2020-01-03 ENCOUNTER — APPOINTMENT (OUTPATIENT)
Dept: INTERNAL MEDICINE | Facility: CLINIC | Age: 80
End: 2020-01-03

## 2022-06-15 ENCOUNTER — TRANSCRIPTION ENCOUNTER (OUTPATIENT)
Age: 82
End: 2022-06-15

## 2023-08-21 NOTE — ASU PREOP CHECKLIST - LAST TOOK
sips h2o with meds/clears Tranexamic Acid Pregnancy And Lactation Text: It is unknown if this medication is safe during pregnancy or breast feeding.